# Patient Record
Sex: MALE | NOT HISPANIC OR LATINO | Employment: FULL TIME | URBAN - METROPOLITAN AREA
[De-identification: names, ages, dates, MRNs, and addresses within clinical notes are randomized per-mention and may not be internally consistent; named-entity substitution may affect disease eponyms.]

---

## 2019-11-23 ENCOUNTER — THERAPY VISIT (OUTPATIENT)
Dept: PHYSICAL THERAPY | Facility: CLINIC | Age: 24
End: 2019-11-23
Payer: COMMERCIAL

## 2019-11-23 DIAGNOSIS — S86.892A LEFT MEDIAL TIBIAL STRESS SYNDROME: ICD-10-CM

## 2019-11-23 PROCEDURE — 97535 SELF CARE MNGMENT TRAINING: CPT | Mod: GP | Performed by: PHYSICAL THERAPIST

## 2019-11-23 PROCEDURE — 97110 THERAPEUTIC EXERCISES: CPT | Mod: GP | Performed by: PHYSICAL THERAPIST

## 2019-11-23 PROCEDURE — 97161 PT EVAL LOW COMPLEX 20 MIN: CPT | Mod: GP | Performed by: PHYSICAL THERAPIST

## 2019-11-23 NOTE — PROGRESS NOTES
[facility] Initial Evaluation  Subjective:  The history is provided by the patient. No  was used.   Affected Side: left lower leg.   Condition occurred with:  Running. This is a recurrent condition   Problem details: Pain in the left shin with running.  Pain in high school with running while training with for cross country skiing.  Works as a consultant.  Travels for work.  Was training for a sprint triathlon.  .                             Objective:  Standing Alignment:          Pelvic:  Iliac crest high L (left leg longer in supine)                    Ankle/Foot Evaluation  ROM:      PROM:    Dorsiflexion:  Left:    10 degrees     Right:   15 degrees                 Strength is normal.  LIGAMENT TESTING: normal              SPECIAL TESTS: not assessed    PALPATION: Palpation of ankle: Sore with palpation over the  distal lower third of the left tibia.    EDEMA: Edema ankle: Slight swelling noted over the distal medial tibia.          MOBILITY TESTING:       Talocrural Left: hypomobile    Talocrural Right: hypomobile      First Ray Left: hypomobile    First Ray Right: hypomobile  FUNCTIONAL TESTS:         Quad:  Single Leg Squat Left: Control is mild loss of control.  Single Leg Squat Right: Control is mild loss of control  Bilateral Leg Squat: Decreased ankle range of motion with squat   Control is mild loss of control                                             Hip Evaluation  Hip PROM:            Internal Rotation: Left: 50    Right: 40  External Rotation: Left: 45    Right: 50                                              Running Evaluation:  Shoe Wear:    Type: Kim cushion glycerin     Date last changed: Over a year ago    Orthotics: None  Training:    Cross-training: walking, biking and pool    Structural Analysis:    Lumbar: Left leg longer by half an inch          Subtalar Joint: Decreased talocrural and subtalar mobility bilaterally    Gait Analysis:    Overall  Impression: Increased heel strike on the left leg, mild hip drop                                      ROS    Assessment/Plan:    Patient is a 24 year old male with lower leg complaints.    Patient has the following significant findings with corresponding treatment plan.                Diagnosis 1: Medial tibial stress syndrome Pain -  manual therapy, self management and education  Decreased ROM/flexibility - manual therapy, therapeutic exercise and home program  Decreased joint mobility - manual therapy, therapeutic exercise and home program  Decreased strength - therapeutic exercise, therapeutic activities and home program  Decreased function - therapeutic activities and home program    Therapy Evaluation Codes:   1) History comprised of:   Personal factors that impact the plan of care:      Time since onset of symptoms.    Comorbidity factors that impact the plan of care are:      None.     Medications impacting care: None.  2) Examination of Body Systems comprised of:   Body structures and functions that impact the plan of care:      Lower leg.   Activity limitations that impact the plan of care are:      Running and Sports.  3) Clinical presentation characteristics are:   Stable/Uncomplicated.  4) Decision-Making    Low complexity using standardized patient assessment instrument and/or measureable assessment of functional outcome.  Cumulative Therapy Evaluation is: Low complexity.    Previous and current functional limitations:  (See Goal Flow Sheet for this information)    Short term and Long term goals: (See Goal Flow Sheet for this information)     Communication ability:  Patient appears to be able to clearly communicate and understand verbal and written communication and follow directions correctly.  Treatment Explanation - The following has been discussed with the patient:   RX ordered/plan of care  Anticipated outcomes  Possible risks and side effects  This patient would benefit from PT intervention to  resume normal activities.   Rehab potential is excellent.    Frequency: one visit    Duration: One visit  Discharge Plan:  Independent in home treatment program.  Reach maximal therapeutic benefit.    Please refer to the daily flowsheet for treatment today, total treatment time and time spent performing 1:1 timed codes.

## 2019-11-26 NOTE — PROGRESS NOTES
[facility] Initial Evaluation  Subjective:      General health as reported by patient is good. Pertinent medical history includes:  None.  Medical allergies: other. Other medical allergies details: penicillin.    Current medications:  None.   Primary job tasks include:  Computer work and prolonged sitting.           Patient .   Barriers include:  None as reported by patient.  Red flags:  None as reported by patient.                      Objective:  System    Physical Exam    General     ROS    Assessment/Plan:

## 2020-01-01 PROBLEM — S86.892A LEFT MEDIAL TIBIAL STRESS SYNDROME: Status: RESOLVED | Noted: 2019-11-23 | Resolved: 2020-01-01

## 2020-01-25 ENCOUNTER — THERAPY VISIT (OUTPATIENT)
Dept: PHYSICAL THERAPY | Facility: CLINIC | Age: 25
End: 2020-01-25
Payer: COMMERCIAL

## 2020-01-25 DIAGNOSIS — S86.892A LEFT MEDIAL TIBIAL STRESS SYNDROME: Primary | ICD-10-CM

## 2020-01-25 PROCEDURE — 97112 NEUROMUSCULAR REEDUCATION: CPT | Mod: GP | Performed by: PHYSICAL THERAPIST

## 2020-01-25 PROCEDURE — 97110 THERAPEUTIC EXERCISES: CPT | Mod: GP | Performed by: PHYSICAL THERAPIST

## 2020-01-25 NOTE — PROGRESS NOTES
Subjective:  HPI                    Objective:  System    Physical Exam    General     ROS    Assessment/Plan:    DISCHARGE REPORT    Progress reporting period is from 11/23/2019 to 1/25/2020.       SUBJECTIVE  Subjective changes noted by patient:  .  Subjective[place]  Feels tightness in the front of the leg. Has been skiing as well and playing basketball.  Sore in the left medial lower leg after basketball yesterday.      Current pain level is  NA .     Previous pain level was  NA  .   Changes in function:  Yes (See Goal flowsheet attached for changes in current functional level)  Adverse reaction to treatment or activity: None    OBJECTIVE  Changes noted in objective findings:  Yes,   Objective: Has some bruising in the left medial lower leg over the tibia.  Patient was not aware of this.  Reviewed running on the treadmill.  Decreased heel strike and quieter gait.  Elsy at 176/min.  Discussed drills to work on glut and hip strength to avoid shuffle gait.  Also discussed left hip strengthening to help with previous snapping hip.       ASSESSMENT/PLAN  Updated problem list and treatment plan: Diagnosis 1:  Left lower leg pain  Pain -  manual therapy, self management, education and home program  Decreased ROM/flexibility - manual therapy, therapeutic exercise and home program  Decreased strength - therapeutic exercise, therapeutic activities and home program  Decreased function - therapeutic activities, home program and video analysis  STG/LTGs have been met or progress has been made towards goals:  Yes (See Goal flow sheet completed today.)  Assessment of Progress: The patient's condition is improving.  The patient's condition has potential to improve.  Self Management Plans:  Patient has been instructed in a home treatment program.  I have re-evaluated this patient and find that the nature, scope, duration and intensity of the therapy is appropriate for the medical condition  of the patient.  Vic continues to require the following intervention to meet STG and LTG's:  Patient needs to continue to work on the home exercise program.      Recommendations:  This patient is ready to be discharged from therapy and continue their home treatment program.    Please refer to the daily flowsheet for treatment today, total treatment time and time spent performing 1:1 timed codes.

## 2023-12-27 ENCOUNTER — TRANSFERRED RECORDS (OUTPATIENT)
Dept: HEALTH INFORMATION MANAGEMENT | Facility: CLINIC | Age: 28
End: 2023-12-27
Payer: COMMERCIAL

## 2023-12-27 ENCOUNTER — MEDICAL CORRESPONDENCE (OUTPATIENT)
Dept: HEALTH INFORMATION MANAGEMENT | Facility: CLINIC | Age: 28
End: 2023-12-27
Payer: COMMERCIAL

## 2023-12-28 ENCOUNTER — TRANSCRIBE ORDERS (OUTPATIENT)
Dept: OTHER | Age: 28
End: 2023-12-28

## 2023-12-28 DIAGNOSIS — R32 URINARY INCONTINENCE: Primary | ICD-10-CM

## 2024-02-23 NOTE — TELEPHONE ENCOUNTER
MEDICAL RECORDS REQUEST   [facility]  [facility]  [address] [place] [address]  PHONE: [phone]  Fax: [phone]        FUTURE VISIT INFORMATION                                                   Vic Mullins, : 1995 scheduled for future visit at [facility]    APPOINTMENT INFORMATION:  Date: 2024  Provider:  Raul Gerardo PA-C  Reason for Visit/Diagnosis:  Urinary incontinence    REFERRAL INFORMATION:  Referring provider:  Dr. Ivone Singh @ [facility]      RECORDS REQUESTED FOR VISIT                                                     NOTES  STATUS/DETAILS   OFFICE NOTE from referring provider Media tab yes, Dr. Ivone Singh at [facility]   MEDICATION LIST  yes   LABS     URINALYSIS (UA)  yes     PRE-VISIT CHECKLIST      Joint diagnostic appointment coordinated correctly          (ensure right order & amount of time) Yes   RECORD COLLECTION COMPLETE Yes

## 2024-03-18 ENCOUNTER — PRE VISIT (OUTPATIENT)
Dept: UROLOGY | Facility: CLINIC | Age: 29
End: 2024-03-18